# Patient Record
Sex: MALE | HISPANIC OR LATINO | Employment: STUDENT | URBAN - METROPOLITAN AREA
[De-identification: names, ages, dates, MRNs, and addresses within clinical notes are randomized per-mention and may not be internally consistent; named-entity substitution may affect disease eponyms.]

---

## 2024-11-10 ENCOUNTER — HOSPITAL ENCOUNTER (EMERGENCY)
Facility: OTHER | Age: 24
Discharge: HOME | End: 2024-11-10
Attending: EMERGENCY MEDICINE
Payer: COMMERCIAL

## 2024-11-10 VITALS
BODY MASS INDEX: 19.49 KG/M2 | TEMPERATURE: 98 F | OXYGEN SATURATION: 100 % | SYSTOLIC BLOOD PRESSURE: 126 MMHG | RESPIRATION RATE: 18 BRPM | DIASTOLIC BLOOD PRESSURE: 75 MMHG | HEIGHT: 66 IN | HEART RATE: 75 BPM | WEIGHT: 121.25 LBS

## 2024-11-10 DIAGNOSIS — S70.01XA CONTUSION OF RIGHT HIP, INITIAL ENCOUNTER: Primary | ICD-10-CM

## 2024-11-10 DIAGNOSIS — T14.90XA INJURY: ICD-10-CM

## 2024-11-10 PROCEDURE — 99283 EMERGENCY DEPT VISIT LOW MDM: CPT | Mod: 25

## 2024-11-10 RX ORDER — IBUPROFEN 800 MG/1
800 TABLET ORAL EVERY 6 HOURS PRN
Qty: 30 TABLET | Refills: 0 | Status: SHIPPED | OUTPATIENT
Start: 2024-11-10

## 2024-11-11 NOTE — ED PROVIDER NOTES
Chief complaint:  Fall (Fall while playing soccer, ecchymosis to right hip, able to bare weight)      Source of information:  Patient    HPI:  Pancho Spencer is a 24 y.o. male presenting with pain above the right hip which has been present for the past few hours.  Playing soccer, when he was knocked to the ground landing with all of his weight in this area.  Since then has developed area of swelling and bruising.  Reports it is painful for him to flex his hip and walk but he is able to bear full weight.  No other injury or complaint    ROS: As per HPI    Review of patient's allergies indicates:  No Known Allergies    No current facility-administered medications on file prior to encounter.     No current outpatient medications on file prior to encounter.       PMH:  As per HPI and below:  History reviewed. No pertinent past medical history.  History reviewed. No pertinent surgical history.      Physical Exam:    Vitals:    11/10/24 2319   BP: 126/75   Pulse: 75   Resp: 18   Temp: 98.3 °F (36.8 °C)       General: No acute distress. Well developed. Well nourished.  Eyes: PERRL. EOM intact. no photophobia, no nystagmus  Conjunctivae - no pallor or icterus.   ENT: HEAD: Normal - atraumatic. Normal external ears. Normal nose.  No facial asymmetry. Mucous membranes - moist.  Neck: Neck supple. no meningismus. No cervical lymphadenopathy.  No JVD.  Musculoskeletal:  Normal weight-bearing, mildly antalgic gait.  No deformities. Normal ROM x4.  Patient does have area of ecchymosis and underlying bony tenderness at the anterior aspect of the right iliac crest.  There is surrounding soft tissue swelling.  He does not have any pain over the greater trochanters.  No pain in the midline of lumbar region.  Intact distal strength and sensation throughout  Integument: No acute skin rashes. No clubbing or cyanosis  Neurologic: No gross neurological deficits.   Psychiatric: Awake, alert.  Oriented x3.  Normal speech and  mentation.        Labs Reviewed - No data to display    Medications - No data to display    Medical Decision Making  Differential diagnosis includes pelvic fracture, contusion, hip fracture    Amount and/or Complexity of Data Reviewed  Radiology: ordered and independent interpretation performed. Decision-making details documented in ED Course.    Risk  Prescription drug management.          Independently interpreted x-ray and/or EKG:  X-ray of pelvis shows no evidence of fracture    MDM:    24 y.o. male with pain and ecchymosis overlying the right iliac crest.  Patient was worried about internal organ injury, internal bleeding.  However given mechanism I think intra-abdominal solid organ injury is very unlikely especially as patient has no tenderness in upper quadrants, no CVA tenderness.  We did have patient urinate and there was no gross hematuria.  Feel hollow organ injury is even less likely.  X-ray revealed no evidence of fracture of iliac or remainder of pelvis/hips.  Prescription for ibuprofen for traumatic hematoma.  Follow up with primary care, especially if symptoms persist, return to the emergency department for new/worsening symptoms    Medications - No data to display    ASSESSMENT:   1. Contusion of right hip, initial encounter    2. Injury             Brigido Lorenzana II, MD  11/11/24 0103

## 2024-11-11 NOTE — ED TRIAGE NOTES
Pt presents to ED c/o fall. Pt states he was playing soccer when fall occurred. Abrasion noted to right hip. Reports right hip pain radiates down right leg and to right abdomen.Denies any complaints while ambulating. Aaox4, NAD Noted.